# Patient Record
Sex: MALE | Race: ASIAN | NOT HISPANIC OR LATINO | URBAN - METROPOLITAN AREA
[De-identification: names, ages, dates, MRNs, and addresses within clinical notes are randomized per-mention and may not be internally consistent; named-entity substitution may affect disease eponyms.]

---

## 2023-09-27 ENCOUNTER — EMERGENCY (EMERGENCY)
Facility: HOSPITAL | Age: 53
LOS: 0 days | Discharge: HOME | End: 2023-09-27
Attending: STUDENT IN AN ORGANIZED HEALTH CARE EDUCATION/TRAINING PROGRAM
Payer: COMMERCIAL

## 2023-09-27 VITALS
DIASTOLIC BLOOD PRESSURE: 76 MMHG | WEIGHT: 164.91 LBS | TEMPERATURE: 98 F | HEART RATE: 70 BPM | HEIGHT: 66.54 IN | RESPIRATION RATE: 20 BRPM | SYSTOLIC BLOOD PRESSURE: 164 MMHG | OXYGEN SATURATION: 98 %

## 2023-09-27 DIAGNOSIS — M79.661 PAIN IN RIGHT LOWER LEG: ICD-10-CM
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
## 2023-09-27 DIAGNOSIS — I10 ESSENTIAL (PRIMARY) HYPERTENSION: ICD-10-CM
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
## 2023-09-27 DIAGNOSIS — E11.9 TYPE 2 DIABETES MELLITUS WITHOUT COMPLICATIONS: ICD-10-CM

## 2023-09-27 DIAGNOSIS — E78.5 HYPERLIPIDEMIA, UNSPECIFIED: ICD-10-CM

## 2023-09-27 DIAGNOSIS — M79.89 OTHER SPECIFIED SOFT TISSUE DISORDERS: ICD-10-CM

## 2023-09-27 PROCEDURE — 99284 EMERGENCY DEPT VISIT MOD MDM: CPT

## 2023-09-27 PROCEDURE — 93971 EXTREMITY STUDY: CPT | Mod: 26,RT

## 2023-09-27 RX ORDER — IBUPROFEN 200 MG
600 TABLET ORAL ONCE
Refills: 0 | Status: COMPLETED | OUTPATIENT
Start: 2023-09-27 | End: 2023-09-27

## 2023-09-27 RX ADMIN — Medication 600 MILLIGRAM(S): at 20:37

## 2023-09-27 RX ADMIN — Medication 600 MILLIGRAM(S): at 19:37

## 2023-09-27 NOTE — ED PROVIDER NOTE - OBJECTIVE STATEMENT
53-year-old male with PMH diabetes, HTN, HLD presents with moderate constant throbbing non-radiating right calf pain/swelling x yesterday. + Worse with movement, better at rest.  No fever, nausea, vomiting, rash, trauma, paresthesia, chest pain, shortness of breath, back pain, ab pain.   Denies hemoptysis, recent surgery/immobilization, hx cancers, hx PE/DVT, hormone use.

## 2023-09-27 NOTE — ED PROVIDER NOTE - PATIENT PORTAL LINK FT
You can access the FollowMyHealth Patient Portal offered by [facility] by registering at the following website: [email or web address]. By joining [employer]’s FollowMyHealth portal, you will also be able to view your health information using other applications (apps) compatible with our system.

## 2023-09-27 NOTE — ED PROVIDER NOTE - PHYSICAL EXAMINATION
PHYSICAL EXAM:    GENERAL: Alert, appears stated age, well appearing, non-toxic  SKIN: Warm, and dry. MMM.   HEAD: NC, AT  EYE: Normal lids/conjunctiva  ENT: Normal hearing, patent oropharynx   NECK: +supple. No meningismus  Pulm: Bilateral BS, normal resp effort, no wheezes, stridor, or retractions  CV: RRR, no M/R/G, 2+and = radial pulses  Mskel: no erythema, cyanosis, edema. no calf tenderness. 2+ dp pulses. +mild gastrocnemius ttp. no rash.  Neuro: AAOx3, no sensory/motor deficits,  5/5 strength throughout.

## 2023-09-27 NOTE — ED PROVIDER NOTE - NSFOLLOWUPINSTRUCTIONS_ED_ALL_ED_FT
Rest, drink plenty of fluids  Advance activity as tolerated  Continue all previously prescribed medications as directed  Follow up with your PMD - bring copies of your results  Return to the ER for severe pain, numbness, weakness, difficulty ambulating, or other new or concerning symptoms   For pain, you may take Tylenol 975mg every six hours and supplement with ibuprofen 600mg, with food, every six hours which can be taken three hours apart from the Tylenol to have a layered effect.

## 2023-09-27 NOTE — ED PROVIDER NOTE - CLINICAL SUMMARY MEDICAL DECISION MAKING FREE TEXT BOX
52yo male with pmh htn, dm, hld, presenting with R calf pain.  Started yesterday after walking for about 1 hour.  Started with slight ache and then gradually worsening.  No swelling, redness, direct trauma.  No hx dvt/ pe.  No fevers.  Ambulatory with pain.  No numbness, weakness, pain elsewhere.  Has not taken anything for it today.  Exam reassuring with only ttp R calf.  Will get sono r/o dvt although lower risk.  Not c/w fracture.  No signs of infection.  Medicate and anticipate dc.

## 2023-09-27 NOTE — ED ADULT NURSE NOTE - NSFALLUNIVINTERV_ED_ALL_ED
Bed/Stretcher in lowest position, wheels locked, appropriate side rails in place/Call bell, personal items and telephone in reach/Instruct patient to call for assistance before getting out of bed/chair/stretcher/Non-slip footwear applied when patient is off stretcher/[place] to call system/Physically safe environment - no spills, clutter or unnecessary equipment/Purposeful proactive rounding/Room/bathroom lighting operational, light cord in reach

## 2023-09-27 NOTE — ED ADULT NURSE NOTE - CAS EDN DISCHARGE ASSESSMENT
likely due to sepis/infection  pt family reports sustained SVT to 170s at  visit 1 month ago  will monitor on remote telemetry - cardiology consult if develops SVT
Alert and oriented to person, place and time

## 2023-09-27 NOTE — ED PROVIDER NOTE - NS ED ATTENDING STATEMENT MOD
This was a shared visit with the LILLIANA. I reviewed and verified the documentation and independently performed the documented: